# Patient Record
Sex: MALE
[De-identification: names, ages, dates, MRNs, and addresses within clinical notes are randomized per-mention and may not be internally consistent; named-entity substitution may affect disease eponyms.]

---

## 2020-01-19 ENCOUNTER — HOSPITAL ENCOUNTER (EMERGENCY)
Dept: HOSPITAL 56 - MW.ED | Age: 35
Discharge: HOME | End: 2020-01-19
Payer: COMMERCIAL

## 2020-01-19 DIAGNOSIS — Z79.899: ICD-10-CM

## 2020-01-19 DIAGNOSIS — I10: ICD-10-CM

## 2020-01-19 DIAGNOSIS — R53.1: Primary | ICD-10-CM

## 2020-01-19 DIAGNOSIS — Z90.49: ICD-10-CM

## 2020-01-19 DIAGNOSIS — E03.9: ICD-10-CM

## 2020-01-19 LAB
BUN SERPL-MCNC: 11 MG/DL (ref 7–18)
CHLORIDE SERPL-SCNC: 103 MMOL/L (ref 98–107)
CO2 SERPL-SCNC: 27.2 MMOL/L (ref 21–32)
GLUCOSE SERPL-MCNC: 89 MG/DL (ref 74–106)
POTASSIUM SERPL-SCNC: 3.8 MMOL/L (ref 3.5–5.1)
SODIUM SERPL-SCNC: 142 MMOL/L (ref 136–148)

## 2020-01-19 NOTE — EDM.PDOC
ED HPI GENERAL MEDICAL PROBLEM





- General


Chief Complaint: General


Stated Complaint: WEAK


Time Seen by Provider: 01/19/20 19:52


Source of Information: Reports: Patient


History Limitations: Reports: No Limitations





- History of Present Illness


INITIAL COMMENTS - FREE TEXT/NARRATIVE: 


HISTORY OF PRESENT ILLNESS: Pt is a 34-year-old male reports he has been 

feeling unwell since Christmas.  He was having "flulike symptoms" which 

resolved for approximately 3 days and then returned.  States he has generalized 

weakness, fatigue, shortness of breath upon walking up a flight of stairs, 

cough and myalgias.  Has tingling sensation to his palms and soles of his feet.

  Denies any fever.  No sore throat.  No rash.  Denies any neck stiffness or 

recent travel.  No blurred vision or slurred speech.  No numbness or focal 

weakness.  No abdominal pain, vomiting or diarrhea.  Patient states he is on 

thyroid medication and has been compliant. Thyroid was last checked 1 month 

ago.  Denies taking any other medications or over-the-counter products.  No 

history of anemia.  No history of STIs.  Denies any urinary symptoms.  No GI 

bleeding. No depression 





REVIEW OF SYSTEMS:  Other than the symptoms associated with the present events, 

the following is reported with regard to recent health:  


General:  (-) fever.  


HENT:  (+) congestion. 


 Respiratory:  (+) cough.  


Cardiovascular:  (-) chest pain.  


GI:  (-) abdominal pain.  


:  (-) urinary complaints. 


 Musculoskeletal:  (-) other aches or pains. 


 Endocrine:  (+) generalized weakness.  


Neurological:  (-) localized weakness.  


Skin: (-) rash








 PAST MEDICAL HISTORY: reviewed as per nursing notes


 SOCIAL HISTORY:  reviewed as per nursing notes,


 MEDICATIONS:  Per nurse's note


 ALLERGIES:  Per nurse's note, reviewed by me 














PHYSICAL EXAMINATION:


 GENERALIZED APPEARANCE: well developed, well nourished in no distress


 VITAL SIGNS:  Per nurse's note, reviewed by me 


 SKIN:  Warm, dry; (-) cyanosis; (-) rash.


 HEAD:  (-) scalp swelling, (-) tenderness.


 EYES:  (-) conjunctival pallor, (-) scleral icterus.


 ENMT:   (-) stridor; mucous membranes moist. no pharyngeal erythema. uvula 

midline. TM intact bilaterally without erythema. 


 NECK:  (-) tenderness, (-) stiffness, No meningismus. no palpable thyromegaly. 


 CHEST AND RESPIRATORY:  (-) rales, (-) rhonchi, (-) wheezes; breath sounds 

equal bilaterally.


 HEART AND CARDIOVASCULAR:  (-) irregularity; (-) murmur, (-) gallop.


 ABDOMEN AND GI:  Soft; (-) tenderness, (-) guarding, (-) rebound, (-) palpable 

masses,


 EXTREMITIES:  (-) deformity, (-) edema.  no calf tenderness or palpable cord. 


 NEURO AND PSYCH: Alert.  Cranial nerves grossly intact; strength symmetric. 

gait steady. 5/5+ strength UE and LE. NIHSS = 0. nml speech. no facial droop. 

sensation intact and equal bilaterally. 2+ DTR. oriented x 3. 


   


 DIAGNOSTICS:


Labs reviewed


EKG: sr at 73 bpm. rad. nml intervals. t wave inversion in iii. borderline q in 

iii, avf. no st elevation or depression




















EMERGENCY DEPARTMENT COURSE AND TREATMENT/MDM:  Patient's condition remained 

stable during Emergency Department evaluation.  Based on history, physical exam

, and diagnostic evaluation, the etiology of patients generalized weakness is 

unclear. The physical exam was unremarkable including a normal neurologic exam. 

Diagnostic workup was ordered and is pending.  Based on this evaluation I 

believe there is low probability of neurologic, cardiac, hemorrhagic, metabolic 

or infectious etiology of the patients weakness. Prior to discharge the 

patient was observed to ambulate in the ED without difficulty. I felt that 

outpatient management with close followup by the patient's primary care 

provider in 1-2 days was appropriate. The patient's questions were answered, 

and discharge precautions and reasons to return to the ED were discussed. 

Hypertension noted, to follow up with pcp regarding this. Will need recheck and 

possibly started on medication if it does not improve. 














PLAN AND FOLLOW-UP:  Patient received written and verbal instructions regarding 

this condition.  Return to ED immediately with any new or worsening symptoms. 

Follow up to be arranged by patient with pcp in 1-2 days for further 

evaluation. Given discharge precautions.  Patient expressed verbal 

understanding. 


 








  ** bodyaches


Pain Score (Numeric/FACES): 9





- Related Data


 Allergies











Allergy/AdvReac Type Severity Reaction Status Date / Time


 


No Known Allergies Allergy   Verified 01/19/20 19:57











Home Meds: 


 Home Meds





Levothyroxine 25 mcg PO DAILY 01/19/20 [History]











Past Medical History


HEENT History: Reports: Impaired Vision


Cardiovascular History: Reports: None


Respiratory History: Reports: None


Genitourinary History: Reports: None


Musculoskeletal History: Reports: None


Neurological History: Reports: None


Psychiatric History: Reports: None


Endocrine/Metabolic History: Reports: Hypothyroidism


Hematologic History: Reports: None


Immunologic History: Reports: None


Oncologic (Cancer) History: Reports: None


Dermatologic History: Reports: None





- Past Surgical History


Head Surgeries/Procedures: Reports: None


HEENT Surgical History: Reports: None


Cardiovascular Surgical History: Reports: None


Respiratory Surgical History: Reports: None


GI Surgical History: Reports: Appendectomy, Hernia, Abdominal, Other (See Below)


Other GI Surgeries/Procedures: liver biposy


Male  Surgical History: Reports: None


Endocrine Surgical History: Reports: None


Neurological Surgical History: Reports: None


Musculoskeletal Surgical History: Reports: None


Oncologic Surgical History: Reports: None


Dermatological Surgical History: Reports: None





Social & Family History





- Family History


Family Medical History: Noncontributory





- Tobacco Use


Smoking Status *Q: Never Smoker


Second Hand Smoke Exposure: No





- Caffeine Use


Caffeine Use: Reports: Energy Drinks, Soda





- Recreational Drug Use


Recreational Drug Use: No





ED ROS GENERAL





- Review of Systems


Review Of Systems: See Below (see dictation)





ED EXAM, GENERAL





- Physical Exam


Exam: See Below (see dictation)





Course





- Vital Signs


Last Recorded V/S: 


 Last Vital Signs











Temp  98.4 F   01/19/20 19:58


 


Pulse  79   01/19/20 22:00


 


Resp  16   01/19/20 22:00


 


BP  149/91 H  01/19/20 22:00


 


Pulse Ox  96   01/19/20 22:00














- Orders/Labs/Meds


Orders: 


 Active Orders 24 hr











 Category Date Time Status


 


 EKG Documentation Completion [RC] STAT Care  01/19/20 20:11 Active











Labs: 


 Laboratory Tests











  01/19/20 01/19/20 01/19/20 Range/Units





  20:28 20:28 20:28 


 


WBC  7.24    (4.0-11.0)  K/uL


 


RBC  5.29    (4.50-5.90)  M/uL


 


Hgb  16.5    (13.0-17.0)  g/dL


 


Hct  47.6    (38.0-50.0)  %


 


MCV  90.0    (80.0-98.0)  fL


 


MCH  31.2    (27.0-32.0)  pg


 


MCHC  34.7    (31.0-37.0)  g/dL


 


RDW Std Deviation  43.0    (28.0-62.0)  fl


 


RDW Coeff of Pipo  13    (11.0-15.0)  %


 


Plt Count  206    (150-400)  K/uL


 


MPV  10.00    (7.40-12.00)  fL


 


Neut % (Auto)  60.2    (48.0-80.0)  %


 


Lymph % (Auto)  32.2    (16.0-40.0)  %


 


Mono % (Auto)  5.5    (0.0-15.0)  %


 


Eos % (Auto)  1.8    (0.0-7.0)  %


 


Baso % (Auto)  0.3    (0.0-1.5)  %


 


Neut # (Auto)  4.4    (1.4-5.7)  K/uL


 


Lymph # (Auto)  2.3    (0.6-2.4)  K/uL


 


Mono # (Auto)  0.4    (0.0-0.8)  K/uL


 


Eos # (Auto)  0.1    (0.0-0.7)  K/uL


 


Baso # (Auto)  0.0    (0.0-0.1)  K/uL


 


Nucleated RBC %  0.0    /100WBC


 


Nucleated RBCs #  0    K/uL


 


Sodium   142   (136-148)  mmol/L


 


Potassium   3.8   (3.5-5.1)  mmol/L


 


Chloride   103   ()  mmol/L


 


Carbon Dioxide   27.2   (21.0-32.0)  mmol/L


 


BUN   11   (7.0-18.0)  mg/dL


 


Creatinine   1.5 H   (0.8-1.3)  mg/dL


 


Est Cr Clr Drug Dosing   87.45   mL/min


 


Estimated GFR (MDRD)   53.6   ml/min


 


Glucose   89   ()  mg/dL


 


Calcium   9.4   (8.5-10.1)  mg/dL


 


Magnesium   2.0   (1.8-2.4)  mg/dL


 


Total Bilirubin   0.4   (0.2-1.0)  mg/dL


 


AST   45 H   (15-37)  IU/L


 


ALT   134 H   (14-63)  IU/L


 


Alkaline Phosphatase   89   ()  U/L


 


Total Protein   8.7 H   (6.4-8.2)  g/dL


 


Albumin   4.1   (3.4-5.0)  g/dL


 


Globulin   4.6 H   (2.6-4.0)  g/dL


 


Albumin/Globulin Ratio   0.9   (0.9-1.6)  


 


Free T4    0.96  (0.76-1.46)  ng/dL


 


TSH 3rd Generation   4.28 H   (0.36-3.74)  uIU/mL














Departure





- Departure


Time of Disposition: 21:46


Disposition: Home, Self-Care 01


Condition: Good


Clinical Impression: 


 Fatigue, Hypertension, Generalized weakness








- Discharge Information


*PRESCRIPTION DRUG MONITORING PROGRAM REVIEWED*: Not Applicable


*COPY OF PRESCRIPTION DRUG MONITORING REPORT IN PATIENT TEE: Not Applicable


Instructions:  Weakness, Easy-to-Read, Hypertension, Easy-to-Read


Referrals: 


Nimco Alford DO [Primary Care Provider] - 1 Day


Forms:  ED Department Discharge


Additional Instructions: 


The following information is given to patients seen in the emergency department 

who are being discharged to home. This information is to outline your options 

for follow-up care. We provide all patients seen in our emergency department 

with a follow-up referral.





The need for follow-up, as well as the timing and circumstances, are variable 

depending upon the specifics of your emergency department visit.





If you don't have a primary care physician on staff, we will provide you with a 

referral. We always advise you to contact your personal physician following an 

emergency department visit to inform them of the circumstance of the visit and 

for follow-up with them and/or the need for any referrals to a consulting 

specialist.





The emergency department will also refer you to a specialist when appropriate. 

This referral assures that you have the opportunity for follow-up care with a 

specialist. All of these measure are taken in an effort to provide you with 

optimal care, which includes your follow-up.





Under all circumstances we always encourage you to contact your private 

physician who remains a resource for coordinating your care. When calling for 

follow-up care, please make the office aware that this follow-up is from your 

recent emergency room visit. If for any reason you are refused follow-up, 

please contact the [facility] Emergency 

Department at [phone] and asked to speak to the emergency department 

charge nurse.








Sepsis Event Note





- Evaluation


Sepsis Screening Result: No Definite Risk





- Focused Exam


Vital Signs: 


 Vital Signs











  Temp Pulse Resp BP Pulse Ox


 


 01/19/20 22:00   79  16  149/91 H  96


 


 01/19/20 19:58  98.4 F  80  18  167/95 H  97











Date Exam was Performed: 01/20/20


Time Exam was Performed: 03:06





- My Orders


Last 24 Hours: 


My Active Orders





01/19/20 20:11


EKG Documentation Completion [RC] STAT 














- Assessment/Plan


Last 24 Hours: 


My Active Orders





01/19/20 20:11


EKG Documentation Completion [RC] STAT

## 2020-01-19 NOTE — CR
Indication:



Chest pain and fatigue.



Technique:



Chest 2 views



Comparison:



None



Findings:



Cardiovascular and mediastinum:  Heart size and vasculature are normal in 

caliber and appearance. 



Lungs and pleural spaces:  Lungs are clear.  No sign of infiltrate or mass. 

 No sign of pleural effusion.  No pneumothorax.  



Bones and soft tissues:  No significant findings.



Impression:



Negative chest.



Dictated by Jerry Yanez MD @ Jan 19 2020  9:09PM



Signed by Dr. Jerry Yanez @ Jan 19 2020  9:12PM